# Patient Record
Sex: FEMALE | Race: OTHER | Employment: UNEMPLOYED | URBAN - METROPOLITAN AREA
[De-identification: names, ages, dates, MRNs, and addresses within clinical notes are randomized per-mention and may not be internally consistent; named-entity substitution may affect disease eponyms.]

---

## 2017-09-07 ENCOUNTER — ROUTINE PRENATAL (OUTPATIENT)
Dept: PERINATAL CARE | Age: 27
End: 2017-09-07
Payer: MEDICAID

## 2017-09-07 ENCOUNTER — HOSPITAL ENCOUNTER (OUTPATIENT)
Age: 27
Discharge: HOME | End: 2017-09-07
Payer: MEDICAID

## 2017-09-07 VITALS
DIASTOLIC BLOOD PRESSURE: 80 MMHG | HEART RATE: 80 BPM | WEIGHT: 232 LBS | SYSTOLIC BLOOD PRESSURE: 118 MMHG | BODY MASS INDEX: 38.65 KG/M2 | TEMPERATURE: 98.4 F | HEIGHT: 65 IN | RESPIRATION RATE: 20 BRPM

## 2017-09-07 DIAGNOSIS — O99.213 OBESITY COMPLICATING PREGNANCY, THIRD TRIMESTER: ICD-10-CM

## 2017-09-07 DIAGNOSIS — O35.07X0 MICROCEPHALY, FETAL, AFFECTING CARE OF MOTHER, ANTEPARTUM, NOT APPLICABLE OR UNSPECIFIED FETUS: Primary | ICD-10-CM

## 2017-09-07 DIAGNOSIS — Z3A.36 36 WEEKS GESTATION OF PREGNANCY: ICD-10-CM

## 2017-09-07 PROBLEM — O99.210 OBESITY COMPLICATING PREGNANCY: Status: ACTIVE | Noted: 2017-09-07

## 2017-09-07 LAB
TOXOPLASM IGM: 0.34 INDEX
TOXOPLASMA BLOOD FOR RATIO: <0.5 IU/ML

## 2017-09-07 PROCEDURE — 76811 OB US DETAILED SNGL FETUS: CPT | Performed by: OBSTETRICS & GYNECOLOGY

## 2017-09-07 PROCEDURE — 99241 PR OFFICE CONSULTATION NEW/ESTAB PATIENT 15 MIN: CPT | Performed by: OBSTETRICS & GYNECOLOGY

## 2017-09-07 PROCEDURE — 86778 TOXOPLASMA ANTIBODY IGM: CPT

## 2017-09-07 PROCEDURE — 76819 FETAL BIOPHYS PROFIL W/O NST: CPT | Performed by: OBSTETRICS & GYNECOLOGY

## 2017-09-07 PROCEDURE — 86658 ENTEROVIRUS ANTIBODY: CPT

## 2017-09-07 PROCEDURE — 86644 CMV ANTIBODY: CPT

## 2017-09-07 PROCEDURE — 36415 COLL VENOUS BLD VENIPUNCTURE: CPT

## 2017-09-07 PROCEDURE — 86777 TOXOPLASMA ANTIBODY: CPT

## 2017-09-07 PROCEDURE — 86645 CMV ANTIBODY IGM: CPT

## 2017-09-07 PROCEDURE — 86747 PARVOVIRUS ANTIBODY: CPT

## 2017-09-08 LAB
SEND OUT REPORT: NORMAL
TEST NAME: NORMAL

## 2017-09-10 LAB
PARVOVIRUS B19 IGG ANTIBODY: 4.57 IV
PARVOVIRUS B19 IGM ANTIBODY: 0.24 IV

## 2017-09-11 LAB — COXSACKIE A9 AB: NORMAL

## 2017-09-12 LAB
CMV IGM: 0.9
CYTOMEGALOVIRUS IGG ANTIBODY: 26.1

## 2017-09-16 LAB
COXSACKIE B1 ANTIBODY: NORMAL
COXSACKIE B2 ANTIBODY: NORMAL
COXSACKIE B3 ANTIBODY: NORMAL
COXSACKIE B4 ANTIBODY: NORMAL
COXSACKIE B5 ANTIBODY: NORMAL
COXSACKIE B6 ANTIBODY: NORMAL

## 2020-10-26 ENCOUNTER — TELEPHONE (OUTPATIENT)
Dept: OBGYN CLINIC | Age: 30
End: 2020-10-26

## 2020-10-27 ENCOUNTER — HOSPITAL ENCOUNTER (OUTPATIENT)
Age: 30
Setting detail: SPECIMEN
Discharge: HOME | End: 2020-10-27
Payer: MEDICAID

## 2020-10-27 LAB — HCG QUANTITATIVE: <1 IU/L

## 2020-10-28 ENCOUNTER — NURSE ONLY (OUTPATIENT)
Dept: OBGYN CLINIC | Age: 30
End: 2020-10-28
Payer: MEDICAID

## 2020-10-28 RX ORDER — MEDROXYPROGESTERONE ACETATE 150 MG/ML
150 INJECTION, SUSPENSION INTRAMUSCULAR ONCE
Qty: 1 ML | Refills: 1
Start: 2020-10-28 | End: 2021-01-19

## 2020-10-28 RX ORDER — MEDROXYPROGESTERONE ACETATE 150 MG/ML
150 INJECTION, SUSPENSION INTRAMUSCULAR ONCE
Status: COMPLETED | OUTPATIENT
Start: 2020-10-28 | End: 2020-10-28

## 2020-10-28 RX ADMIN — MEDROXYPROGESTERONE ACETATE 150 MG: 150 INJECTION, SUSPENSION INTRAMUSCULAR at 16:24

## 2021-01-19 RX ORDER — MEDROXYPROGESTERONE ACETATE 150 MG/ML
INJECTION, SUSPENSION INTRAMUSCULAR
Qty: 1 ML | Refills: 1 | Status: SHIPPED | OUTPATIENT
Start: 2021-01-19 | End: 2021-01-21 | Stop reason: SDUPTHER

## 2021-01-20 ENCOUNTER — HOSPITAL ENCOUNTER (OUTPATIENT)
Age: 31
Setting detail: SPECIMEN
Discharge: HOME | End: 2021-01-20
Payer: MEDICAID

## 2021-01-20 ENCOUNTER — OFFICE VISIT (OUTPATIENT)
Dept: OBGYN CLINIC | Age: 31
End: 2021-01-20
Payer: MEDICAID

## 2021-01-20 VITALS
DIASTOLIC BLOOD PRESSURE: 77 MMHG | SYSTOLIC BLOOD PRESSURE: 116 MMHG | HEIGHT: 65 IN | BODY MASS INDEX: 38.52 KG/M2 | WEIGHT: 231.2 LBS

## 2021-01-20 DIAGNOSIS — Z01.419 WOMEN'S ANNUAL ROUTINE GYNECOLOGICAL EXAMINATION: ICD-10-CM

## 2021-01-20 DIAGNOSIS — N94.6 DYSMENORRHEA: Primary | ICD-10-CM

## 2021-01-20 PROCEDURE — G8484 FLU IMMUNIZE NO ADMIN: HCPCS | Performed by: NURSE PRACTITIONER

## 2021-01-20 PROCEDURE — 96372 THER/PROPH/DIAG INJ SC/IM: CPT | Performed by: NURSE PRACTITIONER

## 2021-01-20 PROCEDURE — 99395 PREV VISIT EST AGE 18-39: CPT | Performed by: NURSE PRACTITIONER

## 2021-01-20 RX ORDER — MEDROXYPROGESTERONE ACETATE 150 MG/ML
150 INJECTION, SUSPENSION INTRAMUSCULAR ONCE
Status: COMPLETED | OUTPATIENT
Start: 2021-01-20 | End: 2021-01-20

## 2021-01-20 RX ADMIN — MEDROXYPROGESTERONE ACETATE 150 MG: 150 INJECTION, SUSPENSION INTRAMUSCULAR at 16:10

## 2021-01-20 NOTE — PROGRESS NOTES
YEARLY PHYSICAL    Date of service: 2021    Heather Harry  Is a 31 y. o.female    PT's PCP is: No primary care provider on file. : 1990                                         Chaperone for Intimate Exam  ? Chaperone was offered as part of the rooming process. Patient declined and agrees to continue with exam without a chaperone. Subjective:       No LMP recorded. Patient has had an injection. Are your menses regular: no menses with Depo    OB History    Para Term  AB Living   1 1 1     1   SAB TAB Ectopic Molar Multiple Live Births             1      # Outcome Date GA Lbr Indra/2nd Weight Sex Delivery Anes PTL Lv   1 Term 10/10/19 38w0d  6 lb 6 oz (2.892 kg) M Vag-Spont   IVAN        Social History     Tobacco Use   Smoking Status Current Every Day Smoker    Types: Cigarettes   Smokeless Tobacco Never Used   Tobacco Comment    1-2 cigarettes QD        Social History     Substance and Sexual Activity   Alcohol Use No       Family History   Problem Relation Age of Onset    Diabetes Paternal Grandfather     Heart Disease Paternal Grandfather     Hypertension Paternal Grandfather     Alzheimer's Disease Paternal Grandmother     Diabetes Paternal Grandmother     Heart Disease Paternal Grandmother     Hypertension Paternal Grandmother     Diabetes Maternal Grandmother     Heart Disease Maternal Grandmother     Hypertension Maternal Grandmother     Diabetes Maternal Grandfather     Heart Disease Maternal Grandfather     Hypertension Maternal Grandfather     Cancer Mother         leukemia    Stroke Mother     Breast Cancer Maternal Aunt        Any family history of breast or ovarian cancer:  Yes    Any family history of blood clots: No      Allergies: No known allergies      Current Outpatient Medications:   medroxyPROGESTERone (DEPO-PROVERA) 150 MG/ML injection, use as directed at [de-identified] office every 3 months, Disp: 1 mL, Rfl: 3    Prenatal Multivit-Min-Fe-FA (PRENATAL VITAMINS PO), Take 1 tablet by mouth daily, Disp: , Rfl:     Social History     Substance and Sexual Activity   Sexual Activity Yes    Partners: Male    Birth control/protection: Injection       Any bleeding or pain with intercourse: No    Last Yearly:  ? Last pap: 2017    Last HPV: n/a    Last Mammogram: n/a    Last Dexascan n/a    Last colorectal screen- n/a    Do you do self breast exams: encouraged monthly SBE    Past Medical History:   Diagnosis Date    Abnormal Pap smear of cervix     ADD (attention deficit disorder)     Allergic rhinitis     Anxiety     H/O headache     H/O human papillomavirus infection     H/O tinnitus     Panic attacks     Vertigo        Past Surgical History:   Procedure Laterality Date    TONSILLECTOMY  2009       Family History   Problem Relation Age of Onset    Diabetes Paternal Grandfather     Heart Disease Paternal Grandfather     Hypertension Paternal Grandfather     Alzheimer's Disease Paternal Grandmother     Diabetes Paternal Grandmother     Heart Disease Paternal Grandmother     Hypertension Paternal Grandmother     Diabetes Maternal Grandmother     Heart Disease Maternal Grandmother     Hypertension Maternal Grandmother     Diabetes Maternal Grandfather     Heart Disease Maternal Grandfather     Hypertension Maternal Grandfather     Cancer Mother         leukemia    Stroke Mother     Breast Cancer Maternal Aunt        Chief Complaint   Patient presents with    Gynecologic Exam     Pap due. Voices no concerns.  Injections     Depo injection. 1 ml given IM left deltoid.           PE:  Vital Signs Blood pressure 116/77, height 5' 5\" (1.651 m), weight 231 lb 3.2 oz (104.9 kg), unknown if currently breastfeeding. Estimated body mass index is 38.47 kg/m² as calculated from the following:    Height as of this encounter: 5' 5\" (1.651 m). Weight as of this encounter: 231 lb 3.2 oz (104.9 kg). HPI: Patient presents today for annual exam. Feeling well, voices no concerns. Denies breast/pelvic pain. Due for pap. No menses with Depo    Review of Systems   All other systems reviewed and are negative. Objective  Heent:   negative   Cor: regular rate and rhythm              Pul:clear to auscultation bilaterally- no wheezes, rales or rhonchi, normal air movement, no respiratory distress      GI: Abdomen soft, non-tender. BS normal. No masses,  No organomegaly           Extremities: normal strength, tone, and muscle mass, ROM of all joints is normal   Breasts: Breast:normal appearance, no masses or tenderness, No nipple retraction or dimpling, No nipple discharge or bleeding   Pelvic Exam: GENITAL/URINARY:  External Genitalia:  General appearance; normal, Hair distribution; normal, Lesions absent  Urethral Meatus:  Size normal, Location normal, Lesions absent, Prolapse absent  Urethra: Fullness absent, Masses absent  Bladder:  Fullness absent, Masses absent, Tenderness absent, Cystocele absent  Vagina:  General appearance normal, Estrogen effect normal, Discharge absent, Lesions absent, Pelvic support normal  Cervix:  General appearance normal, Lesions absent, Discharge absent, Tenderness absent, Enlargement absent, Nodularity absent  Uterus:  Size normal, Tenderness absent  Adenexa: Masses absent, Tenderness absent  Anus/Perineum:  Lesions absent and Masses absent                                    Vaginal discharge: no vaginal discharge                             Assessment and Plan          Diagnosis Orders   1.  Dysmenorrhea  medroxyPROGESTERone (DEPO-PROVERA) injection 150 mg medroxyPROGESTERone (DEPO-PROVERA) 150 MG/ML injection   2. Women's annual routine gynecological examination  PAP SMEAR             I am having Heather LUISITO Waller maintain her Prenatal Multivit-Min-Fe-FA (PRENATAL VITAMINS PO) and medroxyPROGESTERone. We administered medroxyPROGESTERone. Return in about 1 year (around 1/20/2022) for yearly. She was also counseled on her preventative health maintenance recommendations and follow-up. There are no Patient Instructions on file for this visit.     Mariana Sanders,1/21/2021 8:50 PM

## 2021-01-21 RX ORDER — MEDROXYPROGESTERONE ACETATE 150 MG/ML
INJECTION, SUSPENSION INTRAMUSCULAR
Qty: 1 ML | Refills: 3 | Status: SHIPPED | OUTPATIENT
Start: 2021-01-21 | End: 2021-07-29 | Stop reason: SDUPTHER

## 2021-01-29 LAB — CYTOLOGY REPORT: NORMAL

## 2021-04-14 ENCOUNTER — NURSE ONLY (OUTPATIENT)
Dept: OBGYN CLINIC | Age: 31
End: 2021-04-14
Payer: MEDICAID

## 2021-04-14 DIAGNOSIS — N94.6 DYSMENORRHEA: Primary | ICD-10-CM

## 2021-04-14 PROCEDURE — 96372 THER/PROPH/DIAG INJ SC/IM: CPT | Performed by: NURSE PRACTITIONER

## 2021-04-14 RX ORDER — MEDROXYPROGESTERONE ACETATE 150 MG/ML
150 INJECTION, SUSPENSION INTRAMUSCULAR ONCE
Status: COMPLETED | OUTPATIENT
Start: 2021-04-14 | End: 2021-04-14

## 2021-04-14 RX ADMIN — MEDROXYPROGESTERONE ACETATE 150 MG: 150 INJECTION, SUSPENSION INTRAMUSCULAR at 15:43

## 2021-07-20 ENCOUNTER — TELEPHONE (OUTPATIENT)
Dept: OBGYN CLINIC | Age: 31
End: 2021-07-20

## 2021-07-20 DIAGNOSIS — N92.6 IRREGULAR MENSTRUAL CYCLE: Primary | ICD-10-CM

## 2021-07-20 NOTE — TELEPHONE ENCOUNTER
Pt missed depo - two wks over. Could someone put in a lab order for SPT for her please. She would like to come in tomorrow for lab and Thursday for inj. She will need RX sent to pharm also. THANKS!

## 2021-07-28 ENCOUNTER — HOSPITAL ENCOUNTER (OUTPATIENT)
Age: 31
Setting detail: SPECIMEN
Discharge: HOME | End: 2021-07-28
Payer: MEDICAID

## 2021-07-28 DIAGNOSIS — N92.6 IRREGULAR MENSTRUAL CYCLE: ICD-10-CM

## 2021-07-28 LAB — HCG QUALITATIVE: NEGATIVE

## 2021-07-29 DIAGNOSIS — N94.6 DYSMENORRHEA: ICD-10-CM

## 2021-07-29 RX ORDER — MEDROXYPROGESTERONE ACETATE 150 MG/ML
INJECTION, SUSPENSION INTRAMUSCULAR
Qty: 1 ML | Refills: 3 | Status: SHIPPED | OUTPATIENT
Start: 2021-07-29 | End: 2021-07-29

## 2021-09-22 ENCOUNTER — HOSPITAL ENCOUNTER (OUTPATIENT)
Age: 31
Setting detail: SPECIMEN
Discharge: HOME | End: 2021-09-22
Payer: MEDICAID

## 2021-09-22 DIAGNOSIS — N94.6 DYSMENORRHEA: Primary | ICD-10-CM

## 2021-09-22 DIAGNOSIS — N94.6 DYSMENORRHEA: ICD-10-CM

## 2021-09-22 LAB — HCG QUANTITATIVE: <1 IU/L

## 2021-09-23 ENCOUNTER — TELEPHONE (OUTPATIENT)
Dept: OBGYN CLINIC | Age: 31
End: 2021-09-23

## 2021-09-23 DIAGNOSIS — N94.6 DYSMENORRHEA: Primary | ICD-10-CM

## 2021-09-23 DIAGNOSIS — N92.6 IRREGULAR MENSTRUAL CYCLE: ICD-10-CM

## 2021-09-23 RX ORDER — MEDROXYPROGESTERONE ACETATE 150 MG/ML
150 INJECTION, SUSPENSION INTRAMUSCULAR ONCE
Qty: 1 ML | Refills: 1 | Status: SHIPPED | OUTPATIENT
Start: 2021-09-23 | End: 2021-09-23 | Stop reason: SDUPTHER

## 2021-09-23 RX ORDER — MEDROXYPROGESTERONE ACETATE 150 MG/ML
150 INJECTION, SUSPENSION INTRAMUSCULAR ONCE
Qty: 1 ML | Refills: 1 | Status: SHIPPED | OUTPATIENT
Start: 2021-09-23 | End: 2021-12-14 | Stop reason: SDUPTHER

## 2021-09-28 ENCOUNTER — HOSPITAL ENCOUNTER (OUTPATIENT)
Age: 31
Setting detail: SPECIMEN
Discharge: HOME | End: 2021-09-28
Payer: MEDICAID

## 2021-09-28 DIAGNOSIS — N94.6 DYSMENORRHEA: ICD-10-CM

## 2021-09-28 DIAGNOSIS — N92.6 IRREGULAR MENSTRUAL CYCLE: ICD-10-CM

## 2021-09-28 LAB — HCG QUALITATIVE: NEGATIVE

## 2021-09-29 ENCOUNTER — NURSE ONLY (OUTPATIENT)
Dept: OBGYN CLINIC | Age: 31
End: 2021-09-29
Payer: MEDICAID

## 2021-09-29 DIAGNOSIS — N92.6 IRREGULAR MENSTRUAL CYCLE: ICD-10-CM

## 2021-09-29 DIAGNOSIS — N94.6 DYSMENORRHEA: Primary | ICD-10-CM

## 2021-09-29 DIAGNOSIS — Z30.42 ENCOUNTER FOR MANAGEMENT AND INJECTION OF DEPO-PROVERA: ICD-10-CM

## 2021-09-29 PROCEDURE — 96372 THER/PROPH/DIAG INJ SC/IM: CPT | Performed by: OBSTETRICS & GYNECOLOGY

## 2021-09-29 RX ORDER — MEDROXYPROGESTERONE ACETATE 150 MG/ML
150 INJECTION, SUSPENSION INTRAMUSCULAR ONCE
Status: COMPLETED | OUTPATIENT
Start: 2021-09-29 | End: 2021-09-29

## 2021-09-29 RX ADMIN — MEDROXYPROGESTERONE ACETATE 150 MG: 150 INJECTION, SUSPENSION INTRAMUSCULAR at 12:57

## 2021-12-13 ENCOUNTER — TELEPHONE (OUTPATIENT)
Dept: OBGYN CLINIC | Age: 31
End: 2021-12-13

## 2021-12-13 NOTE — TELEPHONE ENCOUNTER
Pt sched 12/20 for annual and depo and needs depo refill sent to [facility] ([id number] please and thank you .

## 2021-12-14 RX ORDER — MEDROXYPROGESTERONE ACETATE 150 MG/ML
150 INJECTION, SUSPENSION INTRAMUSCULAR ONCE
Qty: 1 ML | Refills: 0 | Status: SHIPPED | OUTPATIENT
Start: 2021-12-14 | End: 2022-03-15 | Stop reason: ALTCHOICE

## 2021-12-20 ENCOUNTER — NURSE ONLY (OUTPATIENT)
Dept: OBGYN CLINIC | Age: 31
End: 2021-12-20
Payer: MEDICAID

## 2021-12-20 DIAGNOSIS — N94.6 DYSMENORRHEA: Primary | ICD-10-CM

## 2021-12-20 PROCEDURE — 96372 THER/PROPH/DIAG INJ SC/IM: CPT | Performed by: ADVANCED PRACTICE MIDWIFE

## 2021-12-20 RX ORDER — MEDROXYPROGESTERONE ACETATE 150 MG/ML
150 INJECTION, SUSPENSION INTRAMUSCULAR ONCE
Status: COMPLETED | OUTPATIENT
Start: 2021-12-20 | End: 2021-12-20

## 2021-12-20 RX ADMIN — MEDROXYPROGESTERONE ACETATE 150 MG: 150 INJECTION, SUSPENSION INTRAMUSCULAR at 09:01

## 2022-01-24 ENCOUNTER — OFFICE VISIT (OUTPATIENT)
Dept: OBGYN CLINIC | Age: 32
End: 2022-01-24
Payer: MEDICAID

## 2022-01-24 ENCOUNTER — HOSPITAL ENCOUNTER (OUTPATIENT)
Age: 32
Setting detail: SPECIMEN
Discharge: HOME | End: 2022-01-24

## 2022-01-24 VITALS
WEIGHT: 232.3 LBS | HEIGHT: 65 IN | SYSTOLIC BLOOD PRESSURE: 118 MMHG | DIASTOLIC BLOOD PRESSURE: 80 MMHG | BODY MASS INDEX: 38.7 KG/M2

## 2022-01-24 DIAGNOSIS — N94.6 DYSMENORRHEA: ICD-10-CM

## 2022-01-24 DIAGNOSIS — Z11.3 SCREENING FOR STD (SEXUALLY TRANSMITTED DISEASE): ICD-10-CM

## 2022-01-24 DIAGNOSIS — Z01.419 WOMEN'S ANNUAL ROUTINE GYNECOLOGICAL EXAMINATION: Primary | ICD-10-CM

## 2022-01-24 LAB
CANDIDA SPECIES, DNA PROBE: NEGATIVE
GARDNERELLA VAGINALIS, DNA PROBE: POSITIVE
SOURCE: ABNORMAL
TRICHOMONAS VAGINALIS DNA: NEGATIVE

## 2022-01-24 PROCEDURE — 99395 PREV VISIT EST AGE 18-39: CPT | Performed by: NURSE PRACTITIONER

## 2022-01-24 PROCEDURE — G8484 FLU IMMUNIZE NO ADMIN: HCPCS | Performed by: NURSE PRACTITIONER

## 2022-01-24 ASSESSMENT — ENCOUNTER SYMPTOMS
SHORTNESS OF BREATH: 0
CONSTIPATION: 0
ABDOMINAL PAIN: 0
DIARRHEA: 0

## 2022-01-24 NOTE — PROGRESS NOTES
YEARLY PHYSICAL    Date of service: 2022    Heather Ventura  Is a 36 y.o.  female    PT's PCP is: No primary care provider on file. : 1990                                         Chaperone for Intimate Exam   Chaperone was offered as part of the rooming process. Patient declined and agrees to continue with exam without a chaperone.  Chaperone: n/a      Subjective:       No LMP recorded. Patient has had an injection. Are your menses regular: no menses since Depo    OB History    Para Term  AB Living   2 2 2     2   SAB IAB Ectopic Molar Multiple Live Births             2      # Outcome Date GA Lbr Indra/2nd Weight Sex Delivery Anes PTL Lv   2 Term 10/10/19 38w0d  6 lb 6 oz (2.892 kg) M Vag-Spont   IVAN   1 Term 2017    F Vag-Spont   IVAN        Social History     Tobacco Use   Smoking Status Current Every Day Smoker    Types: Cigarettes   Smokeless Tobacco Never Used   Tobacco Comment    1-2 cigarettes QD        Social History     Substance and Sexual Activity   Alcohol Use No       Family History   Problem Relation Age of Onset    Diabetes Paternal Grandfather     Heart Disease Paternal Grandfather     Hypertension Paternal Grandfather     Alzheimer's Disease Paternal Grandmother     Diabetes Paternal Grandmother     Heart Disease Paternal Grandmother     Hypertension Paternal Grandmother     Diabetes Maternal Grandmother     Heart Disease Maternal Grandmother     Hypertension Maternal Grandmother     Diabetes Maternal Grandfather     Heart Disease Maternal Grandfather     Hypertension Maternal Grandfather     Cancer Mother         leukemia    Stroke Mother     Breast Cancer Maternal Aunt        Any family history of breast or ovarian cancer:  Yes    Any family history of blood clots: No      Allergies: No known allergies      Current Outpatient Medications:     medroxyPROGESTERone (DEPO-PROVERA) 150 MG/ML injection, Inject 1 mL into the muscle once for 1 dose, Disp: 1 mL, Rfl: 0    Social History     Substance and Sexual Activity   Sexual Activity Yes    Partners: Male    Birth control/protection: Injection       Any bleeding or pain with intercourse: no     Last Yearly:  1/2021    Last pap: 1/2021, negative     Last HPV: reflex if ASCUS    Last Mammogram: n/a    Do you do self breast exams: encouraged     Past Medical History:   Diagnosis Date    Abnormal Pap smear of cervix     ADD (attention deficit disorder)     Allergic rhinitis     Anxiety     H/O headache     H/O human papillomavirus infection     H/O tinnitus     Panic attacks     Vertigo        Past Surgical History:   Procedure Laterality Date    TONSILLECTOMY  2009       Family History   Problem Relation Age of Onset    Diabetes Paternal Grandfather     Heart Disease Paternal Grandfather     Hypertension Paternal Grandfather     Alzheimer's Disease Paternal Grandmother     Diabetes Paternal Grandmother     Heart Disease Paternal Grandmother     Hypertension Paternal Grandmother     Diabetes Maternal Grandmother     Heart Disease Maternal Grandmother     Hypertension Maternal Grandmother     Diabetes Maternal Grandfather     Heart Disease Maternal Grandfather     Hypertension Maternal Grandfather     Cancer Mother         leukemia    Stroke Mother     Breast Cancer Maternal Aunt        Chief Complaint   Patient presents with    Gynecologic Exam     Last pap 1/20/21. Discuss switching cycle control meds. Feels like she's gained a lot of weight on depo. PE:  Vital Signs  Blood pressure 118/80, height 5' 5\" (1.651 m), weight 232 lb 4.8 oz (105.4 kg), unknown if currently breastfeeding. Estimated body mass index is 38.66 kg/m² as calculated from the following:    Height as of this encounter: 5' 5\" (1.651 m). Weight as of this encounter: 232 lb 4.8 oz (105.4 kg).     HPI: Patient presents today for annual exam. Feeling well, voices no concerns. Denies breast/pelvic pain. Negative pap 1/2021, desires STD screening. No menses with Depo, desires to switch methods as she feels this contributes to her inability to lose weight. Last time she stopped she was able to lose weight     Review of Systems   Constitutional: Negative for chills, fatigue and fever. Respiratory: Negative for shortness of breath. Cardiovascular: Negative for chest pain. Gastrointestinal: Negative for abdominal pain, constipation and diarrhea. Genitourinary: Negative for dysuria, enuresis, frequency, menstrual problem, pelvic pain, urgency and vaginal bleeding. Neurological: Negative for dizziness, light-headedness and headaches. Physical Exam  Constitutional:       General: She is not in acute distress. Appearance: Normal appearance. She is not ill-appearing. Genitourinary:      Vulva normal.      No vaginal discharge. Right Adnexa: not tender. Left Adnexa: not tender. Uterus is not tender. Breasts:      Right: No mass, nipple discharge, skin change or tenderness. Left: No mass, nipple discharge, skin change or tenderness. HENT:      Head: Normocephalic. Cardiovascular:      Rate and Rhythm: Normal rate and regular rhythm. Pulmonary:      Effort: Pulmonary effort is normal.      Breath sounds: Normal breath sounds. Abdominal:      Palpations: Abdomen is soft. Tenderness: There is no abdominal tenderness. There is no guarding or rebound. Musculoskeletal:         General: Normal range of motion. Neurological:      General: No focal deficit present. Mental Status: She is alert. Psychiatric:         Mood and Affect: Mood normal.         Behavior: Behavior normal.                        Assessment and Plan          Diagnosis Orders   1. Women's annual routine gynecological examination     2. Dysmenorrhea  hCG, Quantitative, Pregnancy   3.  Screening for STD (sexually transmitted disease)  C.trachomatis N.gonorrhoeae DNA    VAGINITIS DNA PROBE       Repeat Annual every 1 year  Cervical Cytology Evaluation begins at 24years old. If Negative Cytology, Follow-up screening per current guidelines. Mammograms every 1year. If 35 yo and last mammogram was negative. Routine healthmaintenance per patients PCP. Desires OCP's, reviewed risks/benefits, aches. With negative hcg with plan for quick start. I have discontinued Heather Waller's Prenatal Multivit-Min-Fe-FA (PRENATAL VITAMINS PO). I am also having her maintain her medroxyPROGESTERone. Return in about 3 months (around 4/24/2022) for med check. She was also counseled on her preventative health maintenance recommendations and follow-up. There are no Patient Instructions on file for this visit.     JENNI Monahan - NP,1/24/2022 10:48 AM

## 2022-01-25 ENCOUNTER — HOSPITAL ENCOUNTER (OUTPATIENT)
Age: 32
Setting detail: SPECIMEN
Discharge: HOME | End: 2022-01-25

## 2022-01-25 DIAGNOSIS — N94.6 DYSMENORRHEA: ICD-10-CM

## 2022-01-25 LAB
C TRACH DNA GENITAL QL NAA+PROBE: NEGATIVE
HCG QUANTITATIVE: <1 IU/L
N. GONORRHOEAE DNA: NEGATIVE
SPECIMEN DESCRIPTION: NORMAL

## 2022-01-25 NOTE — RESULT ENCOUNTER NOTE
Positive BV.  Please notify patient of these results and send Flagyl 500mg BID x7 days- dispense #14, no refills   Gc/ct pending

## 2022-01-26 RX ORDER — METRONIDAZOLE 500 MG/1
500 TABLET ORAL 2 TIMES DAILY
Qty: 14 TABLET | Refills: 0 | Status: SHIPPED | OUTPATIENT
Start: 2022-01-26 | End: 2022-02-02

## 2022-01-27 RX ORDER — NORGESTIMATE AND ETHINYL ESTRADIOL 0.25-0.035
1 KIT ORAL DAILY
Qty: 1 PACKET | Refills: 3 | Status: SHIPPED | OUTPATIENT
Start: 2022-01-27 | End: 2022-04-28 | Stop reason: SDUPTHER

## 2022-03-15 ENCOUNTER — OFFICE VISIT (OUTPATIENT)
Dept: OBGYN CLINIC | Age: 32
End: 2022-03-15
Payer: MEDICAID

## 2022-03-15 ENCOUNTER — HOSPITAL ENCOUNTER (OUTPATIENT)
Age: 32
Setting detail: SPECIMEN
Discharge: HOME | End: 2022-03-15

## 2022-03-15 VITALS — WEIGHT: 222.2 LBS | DIASTOLIC BLOOD PRESSURE: 85 MMHG | SYSTOLIC BLOOD PRESSURE: 132 MMHG | BODY MASS INDEX: 36.98 KG/M2

## 2022-03-15 DIAGNOSIS — Z11.3 SCREENING FOR STD (SEXUALLY TRANSMITTED DISEASE): Primary | ICD-10-CM

## 2022-03-15 DIAGNOSIS — Z11.3 SCREENING FOR STD (SEXUALLY TRANSMITTED DISEASE): ICD-10-CM

## 2022-03-15 LAB
CANDIDA SPECIES, DNA PROBE: NEGATIVE
GARDNERELLA VAGINALIS, DNA PROBE: POSITIVE
HEPATITIS B SURFACE ANTIGEN: NONREACTIVE
HEPATITIS C ANTIBODY: NONREACTIVE
HIV AG/AB: NONREACTIVE
SOURCE: ABNORMAL
T. PALLIDUM, IGG: NONREACTIVE
TRICHOMONAS VAGINALIS DNA: NEGATIVE

## 2022-03-15 PROCEDURE — G8417 CALC BMI ABV UP PARAM F/U: HCPCS | Performed by: NURSE PRACTITIONER

## 2022-03-15 PROCEDURE — G8427 DOCREV CUR MEDS BY ELIG CLIN: HCPCS | Performed by: NURSE PRACTITIONER

## 2022-03-15 PROCEDURE — G8484 FLU IMMUNIZE NO ADMIN: HCPCS | Performed by: NURSE PRACTITIONER

## 2022-03-15 PROCEDURE — 99213 OFFICE O/P EST LOW 20 MIN: CPT | Performed by: NURSE PRACTITIONER

## 2022-03-15 PROCEDURE — 4004F PT TOBACCO SCREEN RCVD TLK: CPT | Performed by: NURSE PRACTITIONER

## 2022-03-15 NOTE — PROGRESS NOTES
PROBLEM VISIT     Date of service: 3/15/2022    Tere Ramirez  Is a 36 y.o. female    PT's PCP is: No primary care provider on file. : 1990                                             Subjective:       No LMP recorded. OB History    Para Term  AB Living   2 2 2     2   SAB IAB Ectopic Molar Multiple Live Births             2      # Outcome Date GA Lbr Indra/2nd Weight Sex Delivery Anes PTL Lv   2 Term 10/10/19 38w0d  6 lb 6 oz (2.892 kg) M Vag-Spont   IVAN   1 Term 2017    F Vag-Spont   IVAN        Social History     Tobacco Use   Smoking Status Current Every Day Smoker    Types: Cigarettes   Smokeless Tobacco Never Used   Tobacco Comment    1-2 cigarettes QD        Social History     Substance and Sexual Activity   Alcohol Use No       Allergies: No known allergies      Current Outpatient Medications:     norgestimate-ethinyl estradiol (ORTHO-CYCLEN, 28,) 0.25-35 MG-MCG per tablet, Take 1 tablet by mouth daily, Disp: 1 packet, Rfl: 3    Social History     Substance and Sexual Activity   Sexual Activity Yes    Partners: Male    Birth control/protection: OCP     Chief Complaint   Patient presents with    Sexually Transmitted Diseases     Reports S. O cheated and would like full STD testing. PE:  Vital Signs  Blood pressure 132/85, weight 222 lb 3.2 oz (100.8 kg), unknown if currently breastfeeding. HPI: Patient here today for STD screening. Reports significant other was unfaithful. Denies any symptoms at this time. PT denies fever, chills, nausea and vomiting       Review of Systems   Constitutional: Negative. Genitourinary: Negative. Physical Exam  Constitutional:       Appearance: Normal appearance. HENT:      Head: Normocephalic. Pulmonary:      Effort: Pulmonary effort is normal.   Genitourinary:     General: Normal vulva. Vagina: No vaginal discharge, erythema or bleeding.       Cervix: Normal.   Musculoskeletal:         General: Normal range of motion. Neurological:      General: No focal deficit present. Mental Status: She is alert. Psychiatric:         Mood and Affect: Mood normal.         Behavior: Behavior normal.       Chaperone: not present     Assessment and Plan          Diagnosis Orders   1. Screening for STD (sexually transmitted disease)  Vaginitis DNA Probe    C.trachomatis N.gonorrhoeae DNA    Hepatitis B Surface Antigen    Hepatitis C Antibody    T. pallidum Ab    HIV Screen           I have discontinued Heather Waller's medroxyPROGESTERone. I am also having her maintain her norgestimate-ethinyl estradiol. Return if symptoms worsen or fail to improve. There are no Patient Instructions on file for this visit.     JENNI Chand NP,3/15/2022 10:52 AM

## 2022-03-16 LAB
C TRACH DNA GENITAL QL NAA+PROBE: NEGATIVE
N. GONORRHOEAE DNA: NEGATIVE
SPECIMEN DESCRIPTION: NORMAL

## 2022-03-17 NOTE — RESULT ENCOUNTER NOTE
Positive BV.  Negative STD screening Please notify patient of these results and send Flagyl 500mg BID x7 days- dispense #14, no refills

## 2022-03-21 RX ORDER — METRONIDAZOLE 500 MG/1
500 TABLET ORAL 2 TIMES DAILY
Qty: 14 TABLET | Refills: 0 | Status: SHIPPED | OUTPATIENT
Start: 2022-03-21 | End: 2022-03-28

## 2022-04-11 ENCOUNTER — TELEPHONE (OUTPATIENT)
Dept: OBGYN CLINIC | Age: 32
End: 2022-04-11

## 2022-04-26 ENCOUNTER — TELEPHONE (OUTPATIENT)
Dept: OBGYN CLINIC | Age: 32
End: 2022-04-26

## 2022-04-26 ENCOUNTER — HOSPITAL ENCOUNTER (OUTPATIENT)
Age: 32
Setting detail: SPECIMEN
Discharge: HOME | End: 2022-04-26

## 2022-04-26 DIAGNOSIS — N91.1 AMENORRHEA, SECONDARY: Primary | ICD-10-CM

## 2022-04-26 DIAGNOSIS — N91.1 AMENORRHEA, SECONDARY: ICD-10-CM

## 2022-04-26 LAB — HCG QUANTITATIVE: <1 MIU/ML

## 2022-04-28 RX ORDER — NORGESTIMATE AND ETHINYL ESTRADIOL 0.25-0.035
1 KIT ORAL DAILY
Qty: 1 PACKET | Refills: 3 | Status: SHIPPED | OUTPATIENT
Start: 2022-04-28

## 2022-04-28 NOTE — RESULT ENCOUNTER NOTE
Rx sent in. Pt reports may be moving out of the [facility] area.  Will call back

## 2023-01-31 ENCOUNTER — HOSPITAL ENCOUNTER (OUTPATIENT)
Age: 33
Setting detail: SPECIMEN
Discharge: HOME | End: 2023-01-31

## 2023-01-31 ENCOUNTER — OFFICE VISIT (OUTPATIENT)
Dept: OBGYN CLINIC | Age: 33
End: 2023-01-31
Payer: MEDICAID

## 2023-01-31 VITALS
HEIGHT: 65 IN | SYSTOLIC BLOOD PRESSURE: 139 MMHG | WEIGHT: 207.8 LBS | BODY MASS INDEX: 34.62 KG/M2 | DIASTOLIC BLOOD PRESSURE: 85 MMHG

## 2023-01-31 DIAGNOSIS — N92.6 IRREGULAR MENSES: ICD-10-CM

## 2023-01-31 DIAGNOSIS — N92.6 IRREGULAR MENSES: Primary | ICD-10-CM

## 2023-01-31 LAB
CANDIDA SPECIES, DNA PROBE: NEGATIVE
GARDNERELLA VAGINALIS, DNA PROBE: NEGATIVE
SOURCE: NORMAL
TRICHOMONAS VAGINALIS DNA: NEGATIVE

## 2023-01-31 PROCEDURE — 4004F PT TOBACCO SCREEN RCVD TLK: CPT | Performed by: NURSE PRACTITIONER

## 2023-01-31 PROCEDURE — G8417 CALC BMI ABV UP PARAM F/U: HCPCS | Performed by: NURSE PRACTITIONER

## 2023-01-31 PROCEDURE — G8427 DOCREV CUR MEDS BY ELIG CLIN: HCPCS | Performed by: NURSE PRACTITIONER

## 2023-01-31 PROCEDURE — 99213 OFFICE O/P EST LOW 20 MIN: CPT | Performed by: NURSE PRACTITIONER

## 2023-01-31 PROCEDURE — G8484 FLU IMMUNIZE NO ADMIN: HCPCS | Performed by: NURSE PRACTITIONER

## 2023-01-31 ASSESSMENT — ENCOUNTER SYMPTOMS: RESPIRATORY NEGATIVE: 1

## 2023-01-31 NOTE — PROGRESS NOTES
PROBLEM VISIT     Date of service: 2023    Nomi Duenas  Is a 29 y.o. female    PT's PCP is: No primary care provider on file. : 1990                                             Subjective:       Patient's last menstrual period was 2023. OB History    Para Term  AB Living   2 2 2     2   SAB IAB Ectopic Molar Multiple Live Births             2      # Outcome Date GA Lbr Indra/2nd Weight Sex Delivery Anes PTL Lv   2 Term 10/10/19 38w0d  6 lb 6 oz (2.892 kg) M Vag-Spont   IVAN   1 Term 2017    F Vag-Spont   IVAN        Social History     Tobacco Use   Smoking Status Some Days    Types: Cigarettes   Smokeless Tobacco Never   Tobacco Comments    1-2 cigarettes QD        Social History     Substance and Sexual Activity   Alcohol Use No       Allergies: No known allergies    No current outpatient medications on file. Social History     Substance and Sexual Activity   Sexual Activity Yes    Partners: Male, Female     Chief Complaint   Patient presents with    Menstrual Problem     C/O irregular cycles. Reports cycles have either been a week early or late. LMP 22 but is now cramping and having light pink spotting. PE:  Vital Signs  Blood pressure 139/85, height 5' 5\" (1.651 m), weight 207 lb 12.8 oz (94.3 kg), last menstrual period 2023, unknown if currently breastfeeding. HPI: Patient presents today with complaints of irregular menses since . States one month menses came 1 week early and the following month 1 week late. Menses typically every 30 days. LMP was 22; yesterday she experienced light pink spotting when she wiped. Denies any further vaginal bleeding since. Denies any vaginal discharge or irritation but has mild itching Does admit to significant stress with being a single mother. PT denies fever, chills, nausea and vomiting       Review of Systems   Constitutional: Negative. Respiratory: Negative.      Endocrine: Negative. Genitourinary:  Positive for menstrual problem (irregular), vaginal bleeding (pink spotting x1) and vaginal pain (mild itch). Negative for dysuria, frequency, pelvic pain and vaginal discharge. Physical Exam  Constitutional:       Appearance: Normal appearance. HENT:      Head: Normocephalic. Pulmonary:      Effort: Pulmonary effort is normal.   Genitourinary:     General: Normal vulva. Vagina: Vaginal discharge (thin white) present. No erythema or bleeding. Cervix: Normal.      Uterus: Normal.    Musculoskeletal:         General: Normal range of motion. Neurological:      General: No focal deficit present. Mental Status: She is alert. Psychiatric:         Mood and Affect: Mood normal.         Behavior: Behavior normal.       Chaperone: not present     Assessment and Plan          Diagnosis Orders   1. Irregular menses  hCG, Quantitative, Pregnancy    Chlamydia Trachomatis & Neisseria gonorrhoeae (GC) by amplified detection    Vaginitis DNA Probe        Denies any concerns for pregnancy as she is only sexually active with females at this time. Cultures collected to rule out infection. Agreeable to imaging. Patient encouraged to track menses since cycle changes have been very recent. Discussed possible contributing factors. I have discontinued Heather Waller's norgestimate-ethinyl estradiol. Return for gyn US. There are no Patient Instructions on file for this visit.     Time spent 20 minutes      JENNI Owusu NP,1/31/2023 12:48 PM

## 2023-02-01 LAB
C TRACH DNA SPEC QL PROBE+SIG AMP: NEGATIVE
N GONORRHOEA DNA SPEC QL PROBE+SIG AMP: NEGATIVE
SPECIMEN DESCRIPTION: NORMAL

## 2023-02-15 ENCOUNTER — TELEPHONE (OUTPATIENT)
Dept: OBGYN CLINIC | Age: 33
End: 2023-02-15

## 2023-02-15 NOTE — TELEPHONE ENCOUNTER
Two NS letters sent for both appointments- pt did resched but knew that she NS and when I explained that we just need a phone call, she said - yep.   2-15-23 cg

## 2023-03-06 ENCOUNTER — TELEPHONE (OUTPATIENT)
Dept: OBGYN CLINIC | Age: 33
End: 2023-03-06

## 2023-03-06 NOTE — TELEPHONE ENCOUNTER
Unable to contact pt- last NS letter sent came back with incorrect address. Last phone call patient was unapologetic and reply was  \"yep\".   3-6-23cg